# Patient Record
Sex: FEMALE | Race: WHITE | NOT HISPANIC OR LATINO
[De-identification: names, ages, dates, MRNs, and addresses within clinical notes are randomized per-mention and may not be internally consistent; named-entity substitution may affect disease eponyms.]

---

## 2019-09-24 PROBLEM — Z00.00 ENCOUNTER FOR PREVENTIVE HEALTH EXAMINATION: Status: ACTIVE | Noted: 2019-09-24

## 2019-09-25 ENCOUNTER — APPOINTMENT (OUTPATIENT)
Age: 74
End: 2019-09-25
Payer: MEDICARE

## 2019-09-25 VITALS — WEIGHT: 195 LBS | HEIGHT: 65 IN | BODY MASS INDEX: 32.49 KG/M2

## 2019-09-25 PROCEDURE — 99203 OFFICE O/P NEW LOW 30 MIN: CPT

## 2019-09-25 NOTE — PHYSICAL EXAM
[FreeTextEntry1] : Physical exam reveals a healthy-looking patient in no apparent distress patient appeared to be fairly alert and oriented examination of the right shoulder post reverse prosthesis demonstrated grade range of motion no pain no swelling examination of the left shoulder demonstrate and limited range of motion at the glenohumeral joint there is no palpable mass no gross neurovascular deficit. Review of the MRI scan of the left shoulder demonstrate a multifocal synovial membrane bursitis with a rice body and extensive was option of the proximal humerus with fluid content no malignant tumor could be identified. At this stage patient was recommended to be followed conservatively however the need for surgical decompression bone grafting of the left humerus was discussed with the patient

## 2019-09-25 NOTE — HISTORY OF PRESENT ILLNESS
[FreeTextEntry1] : Disease the first and 50th over 74 years old with a history of rheumatoid arthritis post bilateral total hip replacement right shoulder reversed bipolar prosthesis a multi-multiple other surgeries. At this stage and patient presented for evaluation of degenerative arthritic changes over the left shoulder are related to rheumatoid arthritis and at the same time and multiple soft tissue and synovial cystic lesions with rice bodies surrounding the left shoulder and multi-focal cyst formation involving the entire left proximal humerus all seems to be related to rheumatoid arthritis

## 2019-09-25 NOTE — REASON FOR VISIT
[FreeTextEntry1] : Presented for evaluation of marked focal soft tissue lesion and the cystic lesion left shoulder related to rheumatoid arthritis

## 2022-05-05 ENCOUNTER — NON-APPOINTMENT (OUTPATIENT)
Age: 77
End: 2022-05-05

## 2022-07-18 ENCOUNTER — APPOINTMENT (OUTPATIENT)
Dept: VASCULAR SURGERY | Facility: CLINIC | Age: 77
End: 2022-07-18

## 2022-07-18 VITALS — HEIGHT: 65 IN | WEIGHT: 180 LBS | BODY MASS INDEX: 29.99 KG/M2

## 2022-07-18 DIAGNOSIS — Z80.0 FAMILY HISTORY OF MALIGNANT NEOPLASM OF DIGESTIVE ORGANS: ICD-10-CM

## 2022-07-18 DIAGNOSIS — I10 ESSENTIAL (PRIMARY) HYPERTENSION: ICD-10-CM

## 2022-07-18 DIAGNOSIS — Z87.891 PERSONAL HISTORY OF NICOTINE DEPENDENCE: ICD-10-CM

## 2022-07-18 DIAGNOSIS — Z82.49 FAMILY HISTORY OF ISCHEMIC HEART DISEASE AND OTHER DISEASES OF THE CIRCULATORY SYSTEM: ICD-10-CM

## 2022-07-18 DIAGNOSIS — M06.9 RHEUMATOID ARTHRITIS, UNSPECIFIED: ICD-10-CM

## 2022-07-18 PROCEDURE — 99203 OFFICE O/P NEW LOW 30 MIN: CPT

## 2022-07-19 PROBLEM — I10 HYPERTENSION: Status: ACTIVE | Noted: 2022-07-18

## 2022-07-19 PROBLEM — Z80.0 FAMILY HISTORY OF MALIGNANT NEOPLASM OF STOMACH: Status: ACTIVE | Noted: 2022-07-18

## 2022-07-19 PROBLEM — M06.9 RHEUMATOID ARTHRITIS: Status: ACTIVE | Noted: 2022-07-18

## 2022-07-19 PROBLEM — Z87.891 FORMER SMOKER: Status: ACTIVE | Noted: 2022-07-18

## 2022-07-19 PROBLEM — Z82.49 FAMILY HISTORY OF PULMONARY EMBOLISM: Status: ACTIVE | Noted: 2022-07-18

## 2022-07-19 RX ORDER — PREDNISONE 5 MG/1
5 TABLET ORAL
Qty: 180 | Refills: 0 | Status: ACTIVE | COMMUNITY
Start: 2022-07-16

## 2022-07-19 RX ORDER — PANTOPRAZOLE 40 MG/1
40 TABLET, DELAYED RELEASE ORAL
Qty: 90 | Refills: 0 | Status: ACTIVE | COMMUNITY
Start: 2021-10-04

## 2022-07-19 RX ORDER — FOLIC ACID 1 MG/1
1 TABLET ORAL
Qty: 90 | Refills: 0 | Status: ACTIVE | COMMUNITY
Start: 2022-03-28

## 2022-07-19 RX ORDER — TRAZODONE HYDROCHLORIDE 50 MG/1
50 TABLET ORAL
Qty: 90 | Refills: 0 | Status: ACTIVE | COMMUNITY
Start: 2021-07-26 | End: 1900-01-01

## 2022-07-19 NOTE — ASSESSMENT
[FreeTextEntry1] : 76 yo female with severe rheumatoid arthritis. She is planning to undergo a right knee replacement. She has bilateral lower extremity edema and skin discoloration. I recommended that she undergo a venous duplex. She will follow up when the results are available.

## 2022-07-19 NOTE — PHYSICAL EXAM
[JVD] : no jugular venous distention  [Normal Breath Sounds] : Normal breath sounds [Normal Rate and Rhythm] : normal rate and rhythm [2+] : left 2+ [Ankle Swelling (On Exam)] : present [Ankle Swelling On The Right] : mild [] : bilaterally [Ankle Swelling Bilaterally] : severe [No Rash or Lesion] : No rash or lesion [Alert] : alert [Oriented to Person] : oriented to person [Oriented to Place] : oriented to place [Oriented to Time] : oriented to time [de-identified] : Awake and Alert [FreeTextEntry1] : no palpable pedal pulse right - triphasic PT signal [de-identified] : Appropriate

## 2022-07-19 NOTE — HISTORY OF PRESENT ILLNESS
[FreeTextEntry1] : 78 yo female presents to the office with a chief complaint of bilateral lower extremity edema and skin discoloration. The patient has severe rheumatoid arthritis and is scheduled to undergo a right knee replacement. She reports that the orthopedist is reluctant to proceed secondary to the edema and skin discoloration. She denies a history of DVT or SVT. She does not currently wear compression stockings.

## 2022-07-19 NOTE — REVIEW OF SYSTEMS
[Fever] : no fever [Chills] : no chills [Lower Ext Edema] : lower extremity edema [Limb Pain] : limb pain [Limb Swelling] : limb swelling [As Noted in HPI] : as noted in HPI [Limb Weakness] : limb weakness [Difficulty Walking] : difficulty walking

## 2022-07-19 NOTE — CONSULT LETTER
[Dear  ___] : Dear  [unfilled], [Consult Letter:] : I had the pleasure of evaluating your patient, [unfilled]. [Please see my note below.] : Please see my note below. [Consult Closing:] : Thank you very much for allowing me to participate in the care of this patient.  If you have any questions, please do not hesitate to contact me. [Sincerely,] : Sincerely, [FreeTextEntry2] : Shyanne Amor [FreeTextEntry3] : Andrea Meraz MD, RPVI\par Chief, Vascular Surgery\par

## 2022-07-19 NOTE — REASON FOR VISIT
[Initial Evaluation] : an initial evaluation [Family Member] : family member [FreeTextEntry1] : bilateral lower extremity skin discoloration and edema

## 2022-08-05 ENCOUNTER — APPOINTMENT (OUTPATIENT)
Dept: HEART AND VASCULAR | Facility: CLINIC | Age: 77
End: 2022-08-05

## 2022-08-05 ENCOUNTER — APPOINTMENT (OUTPATIENT)
Dept: VASCULAR SURGERY | Facility: CLINIC | Age: 77
End: 2022-08-05

## 2022-08-05 VITALS
BODY MASS INDEX: 30.56 KG/M2 | HEART RATE: 79 BPM | HEIGHT: 64 IN | SYSTOLIC BLOOD PRESSURE: 130 MMHG | OXYGEN SATURATION: 94 % | DIASTOLIC BLOOD PRESSURE: 78 MMHG | WEIGHT: 179 LBS

## 2022-08-05 DIAGNOSIS — R60.0 LOCALIZED EDEMA: ICD-10-CM

## 2022-08-05 DIAGNOSIS — I87.2 VENOUS INSUFFICIENCY (CHRONIC) (PERIPHERAL): ICD-10-CM

## 2022-08-05 PROCEDURE — 93970 EXTREMITY STUDY: CPT

## 2022-08-05 PROCEDURE — 99213 OFFICE O/P EST LOW 20 MIN: CPT

## 2022-08-05 NOTE — ASSESSMENT
[FreeTextEntry1] : 78 yo female with severe rheumatoid arthritis. She is planning to undergo a right knee replacement.  She has bilateral skin discoloration and mild edema. She underwent a venous duplex which was negative for DVT and insufficiency. I am uncertain of the etiology of her skin discoloration. Her mild edema is likely dependant edema. I do no think there is a vascular contraindication to proceeding with knee surgery. Standard post procedure DVT prophylaxis should be adequate.\par \par 22 miin

## 2022-08-05 NOTE — PHYSICAL EXAM
[JVD] : no jugular venous distention  [Normal Breath Sounds] : Normal breath sounds [Normal Rate and Rhythm] : normal rate and rhythm [1+] : right 1+ [2+] : left 2+ [Ankle Swelling (On Exam)] : present [Ankle Swelling On The Right] : mild [] : bilaterally [Ankle Swelling Bilaterally] : severe [No Rash or Lesion] : No rash or lesion [Alert] : alert [Oriented to Person] : oriented to person [Oriented to Place] : oriented to place [Oriented to Time] : oriented to time [de-identified] : Awake and Alert [de-identified] : skin discoloration bl [de-identified] : G [de-identified] : Appropriate

## 2022-08-05 NOTE — HISTORY OF PRESENT ILLNESS
[FreeTextEntry1] : 76 yo female presents to the office with a chief complaint of bilateral lower extremity edema and skin discoloration. The patient has severe rheumatoid arthritis and is scheduled to undergo a right knee replacement. She reports that the orthopedist is reluctant to proceed secondary to the edema and skin discoloration. She denies a history of DVT or SVT. She does not currently wear compression stockings.  [de-identified] : 78 yo female scheduled to undergo a right knee replacement for severe arthritis. She has bilateral lower extremity edema and skin discoloration. She underwent a venous dupelx and is here to discuss the results.

## 2023-02-08 ENCOUNTER — TRANSCRIPTION ENCOUNTER (OUTPATIENT)
Age: 78
End: 2023-02-08

## 2023-02-13 ENCOUNTER — APPOINTMENT (OUTPATIENT)
Dept: PULMONOLOGY | Facility: CLINIC | Age: 78
End: 2023-02-13

## 2023-02-13 ENCOUNTER — APPOINTMENT (OUTPATIENT)
Dept: NEUROLOGY | Facility: CLINIC | Age: 78
End: 2023-02-13

## 2023-02-24 ENCOUNTER — APPOINTMENT (OUTPATIENT)
Dept: NEUROLOGY | Facility: CLINIC | Age: 78
End: 2023-02-24
Payer: MEDICARE

## 2023-02-24 ENCOUNTER — NON-APPOINTMENT (OUTPATIENT)
Age: 78
End: 2023-02-24

## 2023-02-24 VITALS
OXYGEN SATURATION: 96 % | SYSTOLIC BLOOD PRESSURE: 165 MMHG | HEART RATE: 76 BPM | HEIGHT: 64 IN | DIASTOLIC BLOOD PRESSURE: 99 MMHG | BODY MASS INDEX: 29.02 KG/M2 | WEIGHT: 170 LBS

## 2023-02-24 DIAGNOSIS — R29.90 UNSPECIFIED SYMPTOMS AND SIGNS INVOLVING THE NERVOUS SYSTEM: ICD-10-CM

## 2023-02-24 PROCEDURE — 99214 OFFICE O/P EST MOD 30 MIN: CPT

## 2023-02-24 RX ORDER — METHOTREXATE 2.5 MG/1
2.5 TABLET ORAL
Qty: 84 | Refills: 0 | Status: DISCONTINUED | COMMUNITY
Start: 2022-02-02 | End: 2023-02-24

## 2023-02-24 RX ORDER — DILTIAZEM HYDROCHLORIDE 120 MG/1
120 CAPSULE, EXTENDED RELEASE ORAL
Qty: 90 | Refills: 0 | Status: DISCONTINUED | COMMUNITY
Start: 2021-10-29 | End: 2023-02-24

## 2023-02-24 RX ORDER — METHADONE HYDROCHLORIDE 10 MG/1
10 TABLET ORAL
Qty: 60 | Refills: 0 | Status: DISCONTINUED | COMMUNITY
Start: 2022-07-01 | End: 2023-02-24

## 2023-02-27 PROBLEM — R29.90 EPISODE OF TRANSIENT NEUROLOGIC SYMPTOMS: Status: ACTIVE | Noted: 2023-02-10

## 2023-02-27 NOTE — PHYSICAL EXAM
[FreeTextEntry1] : Mental Status: alert to month, does not know the day, knows the year. Knows name of hospital. Able to calculate number of quarters in $1.50. Able to do serial sevens. 3/3 registration of three items, 1/3 recall at three minutes, 2/3 with cues. \par CN: visual acuity, VFF, blink to confrontation \par III, IV, VI, PERRL, EOMI \par V sensation normal to light touch, pinprick\par VII normal squint vs resistance, normal smile, face symmetric \par VIII: normal hearing \par IX, X normal gag, symmetric palate, uvula raises midline \par XI normal shrug versus resistance and lateralization of head versus resistance \par XII tongue symmetric, normal strength, no tremor or fasciculation \par Motor: full strength throughout apart form pain limited weakness in left shoulder and in right hip 4-/5\par Sensory: normal to LT \par Reflexes \par Brachioradialis, biceps, triceps  1+ patella unable to elicit and ankles 1+ \par Plantar flexor response bilaterally \par Coordination: no dysmetria on FNF \par Gait : ambulates with a walker, antalgic gait, right hip juts out when ambulating (legs are unequal in length)\par

## 2023-02-27 NOTE — CONSULT LETTER
[Dear  ___] : Dear  [unfilled], [Courtesy Letter:] : I had the pleasure of seeing your patient, [unfilled], in my office today. [Please see my note below.] : Please see my note below. [Sincerely,] : Sincerely, [FreeTextEntry3] : Kang Cruz MD\par [facility] \par

## 2023-02-27 NOTE — ASSESSMENT
[FreeTextEntry1] : Please get a 24-hour EEG\par The day before the EEG and the day of the EEG , please stop levetiracetam\par After the EEG is off, you can restart it (750 mg twice a day)\par Call me in one week to go over the results of the EEG \par If it looks okay, we can decrease dose of levetiracetam to 500 mg twice a day \par Physical therapy \par Goal blood pressure < 130/80\par Continue eliquis \par Return to clinic in four months to see Dr. Kang Cruz \par \par Diagnosis: hypertensive emergency, provoked seizure in the setting of hypertensive emergency, rule out focal impaired aware seizure, less likely TIA \par \par Daughter: Clarisa [phone] \par

## 2023-02-27 NOTE — DATA REVIEWED
[de-identified] : 2/5/23\par \par FINDINGS:\par There is mild diffuse parenchymal volume loss. There are T2 prolongation\par signal abnormalities in the periventricular subcortical white matter likely\par related to mild chronic microvascular ischemic changes.\par \par There is no acute parenchymal hemorrhage, parenchymal mass, mass effect or\par midline shift. There is no extra-axial fluid collection.  There is no\par hydrocephalus.  There is no acute infarct.\par \par The visualized paranasal sinuses are well aerated. Partial opacification\par mastoid air cells.\par \par IMPRESSION:\par Limited exam due to motion\par \par \par 2/4/23 \par CT head without contrast\par \par INDICATION: Possible stroke\par \par TECHNIQUE: CT examination of the head performed without contrast. Multiple\par axial images obtained from skull base to vertex. Sagittal/coronal reformatted\par images obtained from axial data set. Images reviewed in soft tissue and bone\par windows.\par \par \par COMPARISON: None available. \par \par FINDINGS:\par \par Ventricles and sulci are mildly proportionately prominent likely related to\par mild parenchymal volume loss. No hydrocephalus. No extra-axial fluid\par collection identified.\par \par No acute intracranial hemorrhage identified. There is mild subcortical and\par periventricular white matter attenuation nonspecific but favored to reflect\par sequela of mild chronic microvascular ischemia. Gray-white differentiation is\par preserved without CT evidence for acute transcortical infarction. There is no\par significant midline shift, mass effect or herniation.\par \par Sellar and suprasellar region are unremarkable in appearance.\par \par Visualized paranasal sinuses and mastoid air cells are well aerated. No\par significant cerebellar tonsillar herniation. \par \par No displaced calvarial fractures are identified. No suspicious expansile or\par destructive calvarial lesion identified. No significant scalp soft tissue\par swelling identified. \par \par \par \par \par IMPRESSION:\par \par No acute intracranial hemorrhage, hydrocephalus or extra-axial fluid\par collection.\par Mild parenchymal volume loss and mild chronic microvascular ischemic changes.\par No CT evidence for acute transcortical infarction. Please note that MR imaging\par is a more sensitive imaging modality for detection of acute infarction and may\par be obtained as clinically warranted.\par \par If clinicians have any questions/need for clarification regarding this report,\par Dr. Kingsley Rincon can be best reached via the patient secure hospital email\par system\par \par CTA (CT angiogram) of the HEAD and NECK dated 2/4/2023 6:07 PM \par \par CLINICAL STATEMENT: Neuro deficit, acute, stroke suspected.\par \par TECHNIQUE: Following the administration of 60 cc Omnipaque 350 intravenous\par contrast, CT angiograms of the head and neck were obtained. 3D image\par postprocessing was performed with vascular and multi-planar reformats for the\par evaluation of the arteries. All qualitative and quantitative assessments of\par carotid bifurcation and proximal internal carotid artery stenoses are made\par referencing the distal internal carotid artery. \par \par COMPARISON: None.\par \par FINDINGS:\par \par CTA of the head: \par The proximal aspects of anterior, middle, and posterior cerebral arteries are\par patent. Fetal origin of the left posterior cerebral artery is incidentally\par noted. The intracranial vertebral and basilar arteries are patent. No\par hemodynamically significant stenosis or aneurysm is identified. Mild\par calcification of the bilateral distal internal carotid arteries is seen.\par \par CTA of the neck: \par The vertebral arteries, common carotid arteries, and internal carotid arteries\par are normal in course and caliber. There is no hemodynamically significant\par stenosis, arterial dissection, or aneurysm. Mild calcification of the\par bilateral carotid bifurcations is noted.\par \par Other: \par The visualized upper lungs demonstrate centrilobular emphysema. Degenerative\par changes of the spine and bilateral temporomandibular joints are present.\par Posterior fusion hardware within thoracic spine is partially imaged.\par \par \par IMPRESSION:\par \par No hemodynamically significant large vessel stenosis or occlusion in the head\par and neck.\par \par \par CTP: Patient motion is present which limits evaluation. There is an area\par of apparent increased Tmax greater than 6 seconds within the brainstem/right\par cerebellar peduncle region without associated CBF abnormality.\par \par CBF less than 30% volume: 0 mL\par Tmax greater than 6 seconds volume: 10 mL\par Mismatch volume: 10 mL\par Mismatch ratio: Infinite\par  [de-identified] : [facility]: 2/2023 Report pending  -2 hour: left rhythmic delta activity - suggestive of cortical hyperexcitability

## 2023-02-27 NOTE — HISTORY OF PRESENT ILLNESS
[FreeTextEntry1] : 77-year-old  RH woman with a history of MTHFR gene on aspirin, hypothyroidism, rheumatoid arthritis, SVT, hypertension and new onset atrial fibrillation, back surgery. She presented to ED with an episode of not speaking well. Daughter thought speech may have been slurred and she was not making sense. She knew what she wanted to say but couldn't get the words out. She may have been confused as well and was lethargic.  She didn't remember the passcode on her phone. \par Her blood pressure was as high as 222/130. No TNK was given as last known well was out of window. \par On exam by neurology she had intermittent expressive aphasia. NIHSS was 2 (1 for orientation and 1 for mild loss of fluency).  \par She was found to have new onset atrial fibrillation with RVR, given eliquis. \par MRI brain was suboptimal with no acute intracranial hemorrhage or acute infarct. \par She was given diazepam for imaging ? and she was noted to be back to baseline. \par Given concern for seizure she was started on  mg bid. \par She had a 2 hour EEG which showed left rhythmic delta activity suggestive of possible cortical irritability. \par \par Prior to her admission she went to [facility].  Her sodium was low, thought related to a diuretic and her antihypertensive medications were readjusted. She may have been taking an incorrect dose and this is why her BP was so high. No further episodes of word-finding difficulty. Tolerating levetiracetam. No further episodes. \par \par Seizure risk factors\par No further episodes of word-finding difficulty\par No history of episodes of loss of consciousness\par No history of convulsion\par No family history of epilepsy \par No history of meningitis or encephalitis\par No history of major head injury \par No difficulty with birth\par Met developmental milestones\par Did well in school \par \par Medications:\par amlodipine 5 mg qd \par metoprolol 50 mg bid \par levetiracetam 750 mg bid\par eliquis 5 mg bid \par lipitor 40 mg qhs\par lasix 20 mg qd\par leflunomide 20 mg qd \par trazodone 50 mg qhs \par pantoprazole \par folic acid \par vitamin d\par albuterol\par fluticasone \par trelegy \par ondansetron\par \par PMH\par COPD, prior smoker\par atrial fibrillation\par focal seizures \par hypertension \par \par Social History\par Lives with daughter \par Not driving \par ambulates with a walker \par Smoker 2003, 1.5 ppd \par no alcohol, no drugs \par 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 Shyanne Cuevas PMD \par  \par \par \par \par \par \par

## 2023-02-27 NOTE — DISCUSSION/SUMMARY
[FreeTextEntry1] : Felicia is a pleasant 77-year-old RH woman with pmh of smoking, COPD, rheumatoid arthritis, hypertension, MTHFR mutation, new onset atrial fibrillation on eliquis, who presented to [facility] with transient aphasia and confusion in the setting of blood pressure in the 200s systolic. MRI brain without acute stroke. Her symptoms resolved when she was given valium for an imaging study. 2 hour EEG with left LRDA? suggestive of cortical hyper-excitability, no seizure. \par \par On  mg bid. Tolerating well. In physical therapy for unsteady gait, no falls. Lives with supportive daughter. \par \par Discussed differential including hypertensive emergency, provoked focal seizure or unprovoked seizure. Given her advanced age, high fall risk and the fact that she is on systemic AC, would continue an AED for now. Can get an EEG off levetiracetam. If EEG is normal, can consider lowering dose of LEV to 500 mg bid. \par

## 2023-03-01 ENCOUNTER — FORM ENCOUNTER (OUTPATIENT)
Age: 78
End: 2023-03-01

## 2023-03-02 ENCOUNTER — APPOINTMENT (OUTPATIENT)
Dept: NEUROLOGY | Facility: CLINIC | Age: 78
End: 2023-03-02
Payer: MEDICARE

## 2023-03-02 PROCEDURE — 95816 EEG AWAKE AND DROWSY: CPT

## 2023-03-03 ENCOUNTER — APPOINTMENT (OUTPATIENT)
Dept: NEUROLOGY | Facility: CLINIC | Age: 78
End: 2023-03-03

## 2023-03-03 PROCEDURE — 95719 EEG PHYS/QHP EA INCR W/O VID: CPT

## 2023-03-03 PROCEDURE — 95708 EEG WO VID EA 12-26HR UNMNTR: CPT

## 2023-03-03 PROCEDURE — 95700 EEG CONT REC W/VID EEG TECH: CPT

## 2023-03-15 ENCOUNTER — RX RENEWAL (OUTPATIENT)
Age: 78
End: 2023-03-15

## 2023-03-22 DIAGNOSIS — I48.0 PAROXYSMAL ATRIAL FIBRILLATION: ICD-10-CM

## 2023-03-23 ENCOUNTER — APPOINTMENT (OUTPATIENT)
Dept: PULMONOLOGY | Facility: CLINIC | Age: 78
End: 2023-03-23
Payer: MEDICARE

## 2023-03-23 VITALS
OXYGEN SATURATION: 98 % | HEIGHT: 64 IN | BODY MASS INDEX: 29.02 KG/M2 | WEIGHT: 170 LBS | SYSTOLIC BLOOD PRESSURE: 170 MMHG | DIASTOLIC BLOOD PRESSURE: 80 MMHG | HEART RATE: 83 BPM

## 2023-03-23 DIAGNOSIS — J44.9 CHRONIC OBSTRUCTIVE PULMONARY DISEASE, UNSPECIFIED: ICD-10-CM

## 2023-03-23 PROCEDURE — 99213 OFFICE O/P EST LOW 20 MIN: CPT

## 2023-03-23 RX ORDER — LISINOPRIL AND HYDROCHLOROTHIAZIDE TABLETS 20; 12.5 MG/1; MG/1
20-12.5 TABLET ORAL
Qty: 90 | Refills: 0 | Status: DISCONTINUED | COMMUNITY
Start: 2021-08-26 | End: 2023-03-23

## 2023-03-23 NOTE — PHYSICAL EXAM
[No Acute Distress] : no acute distress [Normal Appearance] : normal appearance [No Neck Mass] : no neck mass [Normal Rate/Rhythm] : normal rate/rhythm [Normal S1, S2] : normal s1, s2 [No Murmurs] : no murmurs [No Resp Distress] : no resp distress [Clear to Auscultation Bilaterally] : clear to auscultation bilaterally [No Clubbing] : no clubbing [No Edema] : no edema [Oriented x3] : oriented x3 [Normal Affect] : normal affect [TextBox_99] : Joing deformities in hands c/w RA; kyphosis of chest [TextBox_125] : bruising throughout

## 2023-03-23 NOTE — DISCUSSION/SUMMARY
[FreeTextEntry1] : 77F with COPD here for routine follow-up\par \par #COPD\par - Well controlled since last visit, no exacerbations. She is on chronic low-dose prednisone for RA\par - Her regimen is controlling her symptoms well but she cannot afford Trelegy so has been receiving samples\par - She will run out soon, so will likely need to send Rx for alternative inhaler regimen\par \par RTC in 3-4 months or sooner

## 2023-03-23 NOTE — HISTORY OF PRESENT ILLNESS
[Former] : former [TextBox_4] : Ms. Pastor is a 77F here for follow-up of COPD. She was previously following with Dr. March (last visit 1/2023).\par \par Current regimen: Trelegy\par Exacerbations: rare\par \par PFTs 5/2021\par Moderate obstruction, no BD response. No hyperinflation/airtrapping, DLCO 48%\par \par Pt feels well today and without significant respiratory complaints. She has not had any exacerbations but knows her seasonal allergies will start soon so will resume flonase. She has been compliant with Trelegy but only taking samples. She has not tried other inhalers before so unsure what else has worked for her. Denies cough, shortness of breath, wheezing, chest pressure. Currently she is dealing with more L shoulder pain and as a result her BP is running higher. She has follow-up with her Ortho to address non-invasive treatments for her shoulder and pain control.

## 2023-04-10 ENCOUNTER — NON-APPOINTMENT (OUTPATIENT)
Age: 78
End: 2023-04-10

## 2023-06-07 ENCOUNTER — APPOINTMENT (OUTPATIENT)
Dept: PULMONOLOGY | Facility: CLINIC | Age: 78
End: 2023-06-07
Payer: MEDICARE

## 2023-06-07 VITALS
SYSTOLIC BLOOD PRESSURE: 156 MMHG | HEART RATE: 82 BPM | BODY MASS INDEX: 30.9 KG/M2 | DIASTOLIC BLOOD PRESSURE: 85 MMHG | WEIGHT: 181 LBS | HEIGHT: 64 IN | OXYGEN SATURATION: 96 %

## 2023-06-07 PROCEDURE — 99213 OFFICE O/P EST LOW 20 MIN: CPT

## 2023-06-07 RX ORDER — BUDESONIDE, GLYCOPYRROLATE, AND FORMOTEROL FUMARATE 160; 9; 4.8 UG/1; UG/1; UG/1
160-9-4.8 AEROSOL, METERED RESPIRATORY (INHALATION) TWICE DAILY
Qty: 1 | Refills: 5 | Status: DISCONTINUED | COMMUNITY
Start: 2023-03-23 | End: 2023-06-07

## 2023-06-07 RX ORDER — LEFLUNOMIDE 20 MG/1
20 TABLET, FILM COATED ORAL
Qty: 90 | Refills: 0 | Status: DISCONTINUED | COMMUNITY
Start: 2022-02-02 | End: 2023-06-07

## 2023-06-07 NOTE — HISTORY OF PRESENT ILLNESS
[Former] : former [TextBox_4] : Ms. Pastor is a 77F here for follow-up of COPD. She was previously following with Dr. March (last visit 1/2023).\par \par Current regimen: Trelegy\par Exacerbations: rare\par \par PFTs 5/2021\par Moderate obstruction, no BD response. No hyperinflation/airtrapping, DLCO 48%\par \par Pt feels well today and without significant respiratory complaints. She has not had any exacerbations but knows her seasonal allergies will start soon so will resume flonase. She has been compliant with Trelegy but only taking samples. She has not tried other inhalers before so unsure what else has worked for her. Denies cough, shortness of breath, wheezing, chest pressure. Currently she is dealing with more L shoulder pain and as a result her BP is running higher. She has follow-up with her Ortho to address non-invasive treatments for her shoulder and pain control.\par \par 6/2023\par Ms. Pastor returns today for follow-up. She is again accompanied by her friend. Still dealing with L shoulder pain. She has yet to follow-up with Ortho re: treatment options. Her breathing is improving now that she was able to get the Trelegy through her insurance. She needs Rx to fill one month at a time instead of 3 months. No recent AECOPDs.

## 2023-06-07 NOTE — REVIEW OF SYSTEMS
[Cough] : cough [Sputum] : no sputum [SOB on Exertion] : sob on exertion [Arthralgias] : arthralgias [Chronic Pain] : chronic pain [Negative] : Endocrine

## 2023-06-07 NOTE — PHYSICAL EXAM
[No Acute Distress] : no acute distress [Normal Appearance] : normal appearance [No Neck Mass] : no neck mass [Normal Rate/Rhythm] : normal rate/rhythm [Normal S1, S2] : normal s1, s2 [No Murmurs] : no murmurs [No Resp Distress] : no resp distress [Clear to Auscultation Bilaterally] : clear to auscultation bilaterally [No Clubbing] : no clubbing [No Edema] : no edema [Oriented x3] : oriented x3 [Normal Affect] : normal affect [TextBox_99] : Joint deformities in hands c/w RA; kyphosis of chest

## 2023-06-07 NOTE — DISCUSSION/SUMMARY
[FreeTextEntry1] : 77F with COPD here for routine follow-up\par \par #COPD\par - Well controlled since last visit, no exacerbations but had worsening of symptoms due to interruption in her inhaler regimen from insurance issues, now back on Trelegy and improving. She is on chronic low-dose prednisone for RA\par \par RTC in 3-4 months or sooner

## 2023-06-26 ENCOUNTER — APPOINTMENT (OUTPATIENT)
Dept: NEUROLOGY | Facility: CLINIC | Age: 78
End: 2023-06-26
Payer: MEDICARE

## 2023-06-26 DIAGNOSIS — Z87.898 PERSONAL HISTORY OF OTHER SPECIFIED CONDITIONS: ICD-10-CM

## 2023-06-26 PROCEDURE — 99214 OFFICE O/P EST MOD 30 MIN: CPT | Mod: 95

## 2023-06-26 RX ORDER — LEVETIRACETAM 750 MG/1
750 TABLET, FILM COATED ORAL
Qty: 180 | Refills: 0 | Status: DISCONTINUED | COMMUNITY
Start: 2023-03-10 | End: 2023-06-26

## 2023-06-26 RX ORDER — APIXABAN 5 MG/1
TABLET, FILM COATED ORAL
Refills: 0 | Status: ACTIVE | COMMUNITY

## 2023-06-26 NOTE — PHYSICAL EXAM
[FreeTextEntry1] : [facility] video chat\par knows exact month, day and year. Speech fluent. Can name objects. Can follow commands \par EOMI, face activates symmetrically \par \par

## 2023-06-26 NOTE — HISTORY OF PRESENT ILLNESS
[Home] : at home, [unfilled] , at the time of the visit. [Medical Office: ([facility])___] : at the medical office located in  [Verbal consent obtained from patient] : the patient, [unfilled] [FreeTextEntry1] : Last seen in clinic on 2/24/23. Could not come to clinic today because of a COVID exposure. Doing well. Watching blood pressure, around 140s. Lives with daughter. Taking levetiracetam 750 mg bid. No major side effects. No falls. Ambulates with a walker and a cane. No falls. Doing well.  No clinical events concerning for seizure. \par \par Medications\par levetiracetam 750 mg twice a day \par eliquis 5 mg twice a day \par ondansetron\par folic acid \par prednisone \par trelegy \par trazodone 50 mg at night \par _______________________\par 2/24/23 \par 77-year-old  RH woman with a history of MTHFR gene on aspirin, hypothyroidism, rheumatoid arthritis, SVT, hypertension and new onset atrial fibrillation, back surgery. She presented to ED with an episode of not speaking well. Daughter thought speech may have been slurred and she was not making sense. She knew what she wanted to say but couldn't get the words out. She may have been confused as well and was lethargic.  She didn't remember the passcode on her phone. \par Her blood pressure was as high as 222/130. No TNK was given as last known well was out of window. \par On exam by neurology she had intermittent expressive aphasia. NIHSS was 2 (1 for orientation and 1 for mild loss of fluency).  \par She was found to have new onset atrial fibrillation with RVR, given eliquis. \par MRI brain was suboptimal with no acute intracranial hemorrhage or acute infarct. \par She was given diazepam for imaging ? and she was noted to be back to baseline. \par Given concern for seizure she was started on  mg bid. \par She had a 2 hour EEG which showed left rhythmic delta activity suggestive of possible cortical irritability. \par \par Prior to her admission she went to [facility].  Her sodium was low, thought related to a diuretic and her antihypertensive medications were readjusted. She may have been taking an incorrect dose and this is why her BP was so high. No further episodes of word-finding difficulty. Tolerating levetiracetam. No further episodes. \par \par Seizure risk factors\par No further episodes of word-finding difficulty\par No history of episodes of loss of consciousness\par No history of convulsion\par No family history of epilepsy \par No history of meningitis or encephalitis\par No history of major head injury \par No difficulty with birth\par Met developmental milestones\par Did well in school \par \par Medications:\par amlodipine 5 mg qd \par metoprolol 50 mg bid \par levetiracetam 750 mg bid\par eliquis 5 mg bid \par lipitor 40 mg qhs\par lasix 20 mg qd\par leflunomide 20 mg qd \par trazodone 50 mg qhs \par pantoprazole \par folic acid \par vitamin d\par albuterol\par fluticasone \par trelegy \par ondansetron\par \par PMH\par COPD, prior smoker\par atrial fibrillation\par focal seizures \par hypertension \par \par Social History\par Lives with daughter \par Not driving \par ambulates with a walker \par Smoker 2003, 1.5 ppd \par no alcohol, no drugs \par 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 Shyanne Cuevas PMD \par  \par \par \par \par \par \par

## 2023-06-26 NOTE — DATA REVIEWED
[de-identified] : 2/5/23\par \par FINDINGS:\par There is mild diffuse parenchymal volume loss. There are T2 prolongation\par signal abnormalities in the periventricular subcortical white matter likely\par related to mild chronic microvascular ischemic changes.\par \par There is no acute parenchymal hemorrhage, parenchymal mass, mass effect or\par midline shift. There is no extra-axial fluid collection.  There is no\par hydrocephalus.  There is no acute infarct.\par \par The visualized paranasal sinuses are well aerated. Partial opacification\par mastoid air cells.\par \par IMPRESSION:\par Limited exam due to motion\par \par \par 2/4/23 \par CT head without contrast\par \par INDICATION: Possible stroke\par \par TECHNIQUE: CT examination of the head performed without contrast. Multiple\par axial images obtained from skull base to vertex. Sagittal/coronal reformatted\par images obtained from axial data set. Images reviewed in soft tissue and bone\par windows.\par \par \par COMPARISON: None available. \par \par FINDINGS:\par \par Ventricles and sulci are mildly proportionately prominent likely related to\par mild parenchymal volume loss. No hydrocephalus. No extra-axial fluid\par collection identified.\par \par No acute intracranial hemorrhage identified. There is mild subcortical and\par periventricular white matter attenuation nonspecific but favored to reflect\par sequela of mild chronic microvascular ischemia. Gray-white differentiation is\par preserved without CT evidence for acute transcortical infarction. There is no\par significant midline shift, mass effect or herniation.\par \par Sellar and suprasellar region are unremarkable in appearance.\par \par Visualized paranasal sinuses and mastoid air cells are well aerated. No\par significant cerebellar tonsillar herniation. \par \par No displaced calvarial fractures are identified. No suspicious expansile or\par destructive calvarial lesion identified. No significant scalp soft tissue\par swelling identified. \par \par \par \par \par IMPRESSION:\par \par No acute intracranial hemorrhage, hydrocephalus or extra-axial fluid\par collection.\par Mild parenchymal volume loss and mild chronic microvascular ischemic changes.\par No CT evidence for acute transcortical infarction. Please note that MR imaging\par is a more sensitive imaging modality for detection of acute infarction and may\par be obtained as clinically warranted.\par \par If clinicians have any questions/need for clarification regarding this report,\par Dr. Kingsley Rincon can be best reached via the patient secure hospital email\par system\par \par CTA (CT angiogram) of the HEAD and NECK dated 2/4/2023 6:07 PM \par \par CLINICAL STATEMENT: Neuro deficit, acute, stroke suspected.\par \par TECHNIQUE: Following the administration of 60 cc Omnipaque 350 intravenous\par contrast, CT angiograms of the head and neck were obtained. 3D image\par postprocessing was performed with vascular and multi-planar reformats for the\par evaluation of the arteries. All qualitative and quantitative assessments of\par carotid bifurcation and proximal internal carotid artery stenoses are made\par referencing the distal internal carotid artery. \par \par COMPARISON: None.\par \par FINDINGS:\par \par CTA of the head: \par The proximal aspects of anterior, middle, and posterior cerebral arteries are\par patent. Fetal origin of the left posterior cerebral artery is incidentally\par noted. The intracranial vertebral and basilar arteries are patent. No\par hemodynamically significant stenosis or aneurysm is identified. Mild\par calcification of the bilateral distal internal carotid arteries is seen.\par \par CTA of the neck: \par The vertebral arteries, common carotid arteries, and internal carotid arteries\par are normal in course and caliber. There is no hemodynamically significant\par stenosis, arterial dissection, or aneurysm. Mild calcification of the\par bilateral carotid bifurcations is noted.\par \par Other: \par The visualized upper lungs demonstrate centrilobular emphysema. Degenerative\par changes of the spine and bilateral temporomandibular joints are present.\par Posterior fusion hardware within thoracic spine is partially imaged.\par \par \par IMPRESSION:\par \par No hemodynamically significant large vessel stenosis or occlusion in the head\par and neck.\par \par \par CTP: Patient motion is present which limits evaluation. There is an area\par of apparent increased Tmax greater than 6 seconds within the brainstem/right\par cerebellar peduncle region without associated CBF abnormality.\par \par CBF less than 30% volume: 0 mL\par Tmax greater than 6 seconds volume: 10 mL\par Mismatch volume: 10 mL\par Mismatch ratio: Infinite\par  [de-identified] : [id number]/97392 - Ambulatory EEG: Rare bifrontal sharp waves suggestive of generalized cortical hyper-excitability, rare left anterior temporal LRDA, Rare GRDA\par cEEG_[facility]: 2/2023 Report pending  -2 hour: left rhythmic delta activity - suggestive of cortical hyperexcitability

## 2023-06-26 NOTE — ASSESSMENT
[FreeTextEntry1] : Continue levetiracetam 500 mg bid (EEG with bifrontal sharp waves and left anterior temporal LRDA)\par \par Call if seizure\par Return to clinic in four months

## 2023-09-13 ENCOUNTER — APPOINTMENT (OUTPATIENT)
Dept: PULMONOLOGY | Facility: CLINIC | Age: 78
End: 2023-09-13
Payer: MEDICARE

## 2023-09-13 VITALS
SYSTOLIC BLOOD PRESSURE: 140 MMHG | HEIGHT: 64 IN | HEART RATE: 81 BPM | BODY MASS INDEX: 30.73 KG/M2 | OXYGEN SATURATION: 98 % | WEIGHT: 180 LBS | DIASTOLIC BLOOD PRESSURE: 82 MMHG

## 2023-09-13 PROCEDURE — 99212 OFFICE O/P EST SF 10 MIN: CPT

## 2023-09-13 RX ORDER — ONDANSETRON 4 MG/1
4 TABLET, ORALLY DISINTEGRATING ORAL EVERY 6 HOURS
Qty: 30 | Refills: 3 | Status: DISCONTINUED | COMMUNITY
Start: 2023-06-05 | End: 2023-09-13

## 2023-12-29 RX ORDER — FLUTICASONE FUROATE, UMECLIDINIUM BROMIDE AND VILANTEROL TRIFENATATE 200; 62.5; 25 UG/1; UG/1; UG/1
200-62.5-25 POWDER RESPIRATORY (INHALATION) DAILY
Qty: 1 | Refills: 5 | Status: ACTIVE | COMMUNITY
Start: 2023-05-15 | End: 1900-01-01

## 2024-01-17 ENCOUNTER — APPOINTMENT (OUTPATIENT)
Dept: PULMONOLOGY | Facility: CLINIC | Age: 79
End: 2024-01-17
Payer: MEDICARE

## 2024-01-17 VITALS
HEART RATE: 93 BPM | HEIGHT: 64 IN | WEIGHT: 180 LBS | DIASTOLIC BLOOD PRESSURE: 80 MMHG | TEMPERATURE: 97.9 F | BODY MASS INDEX: 30.73 KG/M2 | OXYGEN SATURATION: 96 % | SYSTOLIC BLOOD PRESSURE: 130 MMHG

## 2024-01-17 DIAGNOSIS — J01.10 ACUTE FRONTAL SINUSITIS, UNSPECIFIED: ICD-10-CM

## 2024-01-17 PROCEDURE — 99213 OFFICE O/P EST LOW 20 MIN: CPT

## 2024-01-17 RX ORDER — ALBUTEROL SULFATE 90 UG/1
108 (90 BASE) INHALANT RESPIRATORY (INHALATION)
Qty: 1 | Refills: 5 | Status: ACTIVE | COMMUNITY
Start: 2022-04-14 | End: 1900-01-01

## 2024-01-17 NOTE — REVIEW OF SYSTEMS
[SOB on Exertion] : sob on exertion [Arthralgias] : arthralgias [Chronic Pain] : chronic pain [Negative] : Endocrine [Fever] : no fever [Sore Throat] : sore throat [Nasal Congestion] : nasal congestion [Postnasal Drip] : postnasal drip [Sinus Problems] : sinus problems [Cough] : cough [Hemoptysis] : no hemoptysis [Chest Tightness] : no chest tightness [Sputum] : sputum [Dyspnea] : no dyspnea [Pleuritic Pain] : no pleuritic pain [Wheezing] : no wheezing [Headache] : headache [Dizziness] : dizziness

## 2024-01-17 NOTE — PHYSICAL EXAM
[No Acute Distress] : no acute distress [Normal Rate/Rhythm] : normal rate/rhythm [Normal S1, S2] : normal s1, s2 [No Murmurs] : no murmurs [No Resp Distress] : no resp distress [Clear to Auscultation Bilaterally] : clear to auscultation bilaterally [No Clubbing] : no clubbing [No Edema] : no edema [Oriented x3] : oriented x3 [Normal Affect] : normal affect [No Acc Muscle Use] : no acc muscle use [TextBox_11] : Could not visualize R TM, L TM without fullness or exudate [TextBox_99] : Joint deformities in hands c/w RA; kyphosis of chest

## 2024-01-17 NOTE — HISTORY OF PRESENT ILLNESS
[TextBox_4] : Ms. Pozo is a 77F here for follow-up of COPD. She was previously following with Dr. March (last visit 1/2023).  Current regimen: Trelegy Exacerbations: rare  PFTs 5/2021 Moderate obstruction, no BD response. No hyperinflation/airtrapping, DLCO 48%  Pt feels well today and without significant respiratory complaints. She has not had any exacerbations but knows her seasonal allergies will start soon so will resume flonase. She has been compliant with Trelegy but only taking samples. She has not tried other inhalers before so unsure what else has worked for her. Denies cough, shortness of breath, wheezing, chest pressure. Currently she is dealing with more L shoulder pain and as a result her BP is running higher. She has follow-up with her Ortho to address non-invasive treatments for her shoulder and pain control.  6/2023 Ms. Pozo returns today for follow-up. She is again accompanied by her friend. Still dealing with L shoulder pain. She has yet to follow-up with Ortho re: treatment options. Her breathing is improving now that she was able to get the Trelegy through her insurance. She needs Rx to fill one month at a time instead of 3 months. No recent AECOPDs.    9/2023 Ms. Pozo returns today for follow-up. Doing well since last visit. No recent exacerbations. Denies cough, sputum production, wheezing. She remains compliant with Trelegy. She is requesting refill of zofran for occasional nausea that is food-related.  1/2024 Ms. POZO returns today for follow-up. She is again accompanied by her family member. She notes that about a week ago she has had sinus fullness with subsequent HA and new cough. She's had episodes of dizziness due to head fullness. She also notes low grade fever last week. She now has a more productive cough and runny nose. No ear pain, eye pain, shortness of breath, wheezing, hemoptysis.

## 2024-01-19 DIAGNOSIS — B37.31 ACUTE CANDIDIASIS OF VULVA AND VAGINA: ICD-10-CM

## 2024-01-19 RX ORDER — FLUCONAZOLE 150 MG/1
150 TABLET ORAL
Qty: 2 | Refills: 0 | Status: ACTIVE | COMMUNITY
Start: 2024-01-19 | End: 1900-01-01

## 2024-01-21 ENCOUNTER — RX RENEWAL (OUTPATIENT)
Age: 79
End: 2024-01-21

## 2024-01-21 RX ORDER — LEVETIRACETAM 500 MG/1
500 TABLET, FILM COATED ORAL TWICE DAILY
Qty: 180 | Refills: 3 | Status: ACTIVE | COMMUNITY
Start: 2023-06-26 | End: 1900-01-01

## 2024-02-07 ENCOUNTER — RX RENEWAL (OUTPATIENT)
Age: 79
End: 2024-02-07

## 2024-04-10 RX ORDER — ONDANSETRON 8 MG/1
8 TABLET, ORALLY DISINTEGRATING ORAL
Qty: 30 | Refills: 3 | Status: ACTIVE | COMMUNITY
Start: 2023-06-21 | End: 1900-01-01

## 2024-04-12 ENCOUNTER — APPOINTMENT (OUTPATIENT)
Dept: NEUROLOGY | Facility: CLINIC | Age: 79
End: 2024-04-12
Payer: MEDICARE

## 2024-04-12 VITALS
BODY MASS INDEX: 30.73 KG/M2 | OXYGEN SATURATION: 95 % | HEART RATE: 81 BPM | DIASTOLIC BLOOD PRESSURE: 86 MMHG | HEIGHT: 64 IN | SYSTOLIC BLOOD PRESSURE: 147 MMHG | WEIGHT: 180 LBS

## 2024-04-12 DIAGNOSIS — Z74.09 OTHER REDUCED MOBILITY: ICD-10-CM

## 2024-04-12 DIAGNOSIS — E55.9 VITAMIN D DEFICIENCY, UNSPECIFIED: ICD-10-CM

## 2024-04-12 PROCEDURE — 99215 OFFICE O/P EST HI 40 MIN: CPT

## 2024-04-12 RX ORDER — VITAMIN B COMPLEX
CAPSULE ORAL
Refills: 0 | Status: ACTIVE | COMMUNITY

## 2024-04-12 RX ORDER — CHROMIUM 200 MCG
TABLET ORAL
Refills: 0 | Status: ACTIVE | COMMUNITY

## 2024-04-12 RX ORDER — AMOXICILLIN AND CLAVULANATE POTASSIUM 500; 125 MG/1; MG/1
500-125 TABLET, FILM COATED ORAL
Qty: 14 | Refills: 0 | Status: DISCONTINUED | COMMUNITY
Start: 2024-01-17 | End: 2024-04-12

## 2024-04-12 RX ORDER — METOPROLOL TARTRATE 100 MG/1
100 TABLET, FILM COATED ORAL
Refills: 0 | Status: ACTIVE | COMMUNITY

## 2024-04-12 NOTE — DISCUSSION/SUMMARY
[FreeTextEntry1] : Felicia is a pleasant 78-year-old RH woman with pmh of smoking, COPD, rheumatoid arthritis, hypertension, MTHFR mutation, new onset atrial fibrillation on eliquis, who presented to [facility] 2/2023 with transient aphasia and confusion in the setting of blood pressure in the 200s systolic. MRI brain without acute stroke. Her symptoms resolved when she was given valium for an imaging study. 2 hour EEG with left LRDA? suggestive of cortical hyper-excitability, no seizure.  On  mg bid. Tolerating well. In physical therapy for unsteady gait, no falls. Lives with supportive daughter.  Discussed differential including hypertensive emergency, provoked focal seizure or unprovoked seizure. Given her advanced age, high fall risk and the fact that she is on systemic AC, would continue an AED for now. Most recent EEG with rare bifrontal sharp waves and left anterior temporal LRDA, rare GRDA.  No further seizures. Doing well apart from unsteady gait.

## 2024-04-12 NOTE — HISTORY OF PRESENT ILLNESS
[FreeTextEntry1] : Last seen in clinic on 1/26/24. Doing okay. Presents with daughter who she lives with. States blood pressure is finally under control. No seizures. Tolerating levetiracetam. Wants to know if she can drive. Daughter doesn't want her to drive, does not think it is safe.  Has a very unsteady gait due to left hip and knee surgery (one leg (right) is shorter than the left)). No falls. Ambulates with a walker. Does not want physical therapy.   Daughter upset as mom eats pizza, ice cream and is not active. Does not go outside. Rheumatoid arthritis is acting up.  She knows not to miss does of eliquis.   Daughter does not believe she can drive safely due to poor mobility.    Dr. Torres : heart  Dr. Fernandes: pulmonary No seizures   Medications: levetiracetam 500 mg every 12 hours folic acid 1 mg  zofran  pantoprazole prednisone 5 mg   trazodone 50 mg  amlodipine 5 mg  hydralazine Olmesartan leflunomide 20 mg  vitamin d metoprolol 100 mg  albuterol _______________________________________________ 6/26/23  Last seen in clinic on 2/24/23. Could not come to clinic today because of a COVID exposure. Doing well. Watching blood pressure, around 140s. Lives with daughter. Taking levetiracetam 750 mg bid. No major side effects. No falls. Ambulates with a walker and a cane. No falls. Doing well.  No clinical events concerning for seizure.   Medications levetiracetam 750 mg twice a day  eliquis 5 mg twice a day  ondansetron folic acid  prednisone  trelegy  trazodone 50 mg at night  _______________________ 2/24/23  77-year-old  RH woman with a history of MTHFR gene on aspirin, hypothyroidism, rheumatoid arthritis, SVT, hypertension and new onset atrial fibrillation, back surgery. She presented to ED with an episode of not speaking well. Daughter thought speech may have been slurred and she was not making sense. She knew what she wanted to say but couldn't get the words out. She may have been confused as well and was lethargic.  She didn't remember the passcode on her phone.  Her blood pressure was as high as 222/130. No TNK was given as last known well was out of window.  On exam by neurology she had intermittent expressive aphasia. NIHSS was 2 (1 for orientation and 1 for mild loss of fluency).   She was found to have new onset atrial fibrillation with RVR, given eliquis.  MRI brain was suboptimal with no acute intracranial hemorrhage or acute infarct.  She was given diazepam for imaging ? and she was noted to be back to baseline.  Given concern for seizure she was started on  mg bid.  She had a 2 hour EEG which showed left rhythmic delta activity suggestive of possible cortical irritability.   Prior to her admission she went to [facility].  Her sodium was low, thought related to a diuretic and her antihypertensive medications were readjusted. She may have been taking an incorrect dose and this is why her BP was so high. No further episodes of word-finding difficulty. Tolerating levetiracetam. No further episodes.   Seizure risk factors No further episodes of word-finding difficulty No history of episodes of loss of consciousness No history of convulsion No family history of epilepsy  No history of meningitis or encephalitis No history of major head injury  No difficulty with birth Met developmental milestones Did well in school   Medications: amlodipine 5 mg qd  metoprolol 50 mg bid  levetiracetam 750 mg bid eliquis 5 mg bid  lipitor 40 mg qhs lasix 20 mg qd leflunomide 20 mg qd  trazodone 50 mg qhs  pantoprazole  folic acid  vitamin d albuterol fluticasone  trelegy  ondansetron  [facility] COPD, prior smoker atrial fibrillation focal seizures  hypertension   Social History Lives with daughter  Not driving  ambulates with a walker  Smoker 2003, 1.5 ppd  no alcohol, no drugs  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 Shyanne Cuevas PMD

## 2024-04-12 NOTE — PHYSICAL EXAM
[FreeTextEntry1] : Mental Status: knows month, day and year. Knows name of hospital. $2.25 = 9. Able to do serial sevens.  Language/Speech : speech fluent Cranial Nerves  II: Pupils equal and reactive,  VFF full III, IV, VI: EOMI V : normal sensation in V1-V3 b/l VII : no asymmetry, no nasolabial fold flattening VIII: normal hearing to voice  IX, X: normal palatal elevation, uvula midline XII : midline tongue protrusion Motor: no drift in limbs apart from weakness in left shoulder, cannot fully elevate arm  Sensory: normal to light touch  Coordination: Normal finger to nose Gait: unsteady gait, in part due to one leg being shorter than the other , appears to slightly waddle when walking, feet everted, needs assistance getting onto the examination bench

## 2024-04-12 NOTE — DATA REVIEWED
[de-identified] : 2/5/23  FINDINGS: There is mild diffuse parenchymal volume loss. There are T2 prolongation signal abnormalities in the periventricular subcortical white matter likely related to mild chronic microvascular ischemic changes.  There is no acute parenchymal hemorrhage, parenchymal mass, mass effect or midline shift. There is no extra-axial fluid collection.  There is no hydrocephalus.  There is no acute infarct.  The visualized paranasal sinuses are well aerated. Partial opacification mastoid air cells.  IMPRESSION: Limited exam due to motion   2/4/23  CT head without contrast  INDICATION: Possible stroke  TECHNIQUE: CT examination of the head performed without contrast. Multiple axial images obtained from skull base to vertex. Sagittal/coronal reformatted images obtained from axial data set. Images reviewed in soft tissue and bone windows.   COMPARISON: None available.   FINDINGS:  Ventricles and sulci are mildly proportionately prominent likely related to mild parenchymal volume loss. No hydrocephalus. No extra-axial fluid collection identified.  No acute intracranial hemorrhage identified. There is mild subcortical and periventricular white matter attenuation nonspecific but favored to reflect sequela of mild chronic microvascular ischemia. Gray-white differentiation is preserved without CT evidence for acute transcortical infarction. There is no significant midline shift, mass effect or herniation.  Sellar and suprasellar region are unremarkable in appearance.  Visualized paranasal sinuses and mastoid air cells are well aerated. No significant cerebellar tonsillar herniation.   No displaced calvarial fractures are identified. No suspicious expansile or destructive calvarial lesion identified. No significant scalp soft tissue swelling identified.      IMPRESSION:  No acute intracranial hemorrhage, hydrocephalus or extra-axial fluid collection. Mild parenchymal volume loss and mild chronic microvascular ischemic changes. No CT evidence for acute transcortical infarction. Please note that MR imaging is a more sensitive imaging modality for detection of acute infarction and may be obtained as clinically warranted.  If clinicians have any questions/need for clarification regarding this report, Dr. Kingsley Rincon can be best reached via the patient secure hospital email system  CTA (CT angiogram) of the HEAD and NECK dated 2/4/2023 6:07 PM   CLINICAL STATEMENT: Neuro deficit, acute, stroke suspected.  TECHNIQUE: Following the administration of 60 cc Omnipaque 350 intravenous contrast, CT angiograms of the head and neck were obtained. 3D image postprocessing was performed with vascular and multi-planar reformats for the evaluation of the arteries. All qualitative and quantitative assessments of carotid bifurcation and proximal internal carotid artery stenoses are made referencing the distal internal carotid artery.   COMPARISON: None.  FINDINGS:  CTA of the head:  The proximal aspects of anterior, middle, and posterior cerebral arteries are patent. Fetal origin of the left posterior cerebral artery is incidentally noted. The intracranial vertebral and basilar arteries are patent. No hemodynamically significant stenosis or aneurysm is identified. Mild calcification of the bilateral distal internal carotid arteries is seen.  CTA of the neck:  The vertebral arteries, common carotid arteries, and internal carotid arteries are normal in course and caliber. There is no hemodynamically significant stenosis, arterial dissection, or aneurysm. Mild calcification of the bilateral carotid bifurcations is noted.  Other:  The visualized upper lungs demonstrate centrilobular emphysema. Degenerative changes of the spine and bilateral temporomandibular joints are present. Posterior fusion hardware within thoracic spine is partially imaged.   IMPRESSION:  No hemodynamically significant large vessel stenosis or occlusion in the head and neck.   CTP: Patient motion is present which limits evaluation. There is an area of apparent increased Tmax greater than 6 seconds within the brainstem/right cerebellar peduncle region without associated CBF abnormality.  CBF less than 30% volume: 0 mL Tmax greater than 6 seconds volume: 10 mL Mismatch volume: 10 mL Mismatch ratio: Infinite  [de-identified] : 3/2/2023 - Ambulatory EEG: Rare bifrontal sharp waves suggestive of generalized cortical hyper-excitability, rare left anterior temporal LRDA, Rare GRDA cEEG_[facility]: 2/2023 Report pending  -2 hour: left rhythmic delta activity - suggestive of cortical hyperexcitability

## 2024-04-17 LAB
25(OH)D3 SERPL-MCNC: 35.2 NG/ML
ALBUMIN SERPL ELPH-MCNC: 3.8 G/DL
ALP BLD-CCNC: 125 U/L
ALT SERPL-CCNC: 16 U/L
ANION GAP SERPL CALC-SCNC: 15 MMOL/L
AST SERPL-CCNC: 23 U/L
BILIRUB SERPL-MCNC: 0.4 MG/DL
BUN SERPL-MCNC: 9 MG/DL
CALCIUM SERPL-MCNC: 8.8 MG/DL
CHLORIDE SERPL-SCNC: 100 MMOL/L
CO2 SERPL-SCNC: 25 MMOL/L
CREAT SERPL-MCNC: 0.89 MG/DL
EGFR: 66 ML/MIN/1.73M2
GLUCOSE SERPL-MCNC: 91 MG/DL
LEVETIRACETAM SERPL-MCNC: 22.1 UG/ML
POTASSIUM SERPL-SCNC: 4.1 MMOL/L
PROT SERPL-MCNC: 6.6 G/DL
SODIUM SERPL-SCNC: 140 MMOL/L
VIT B12 SERPL-MCNC: 686 PG/ML

## 2024-05-07 ENCOUNTER — APPOINTMENT (OUTPATIENT)
Dept: NEUROLOGY | Facility: CLINIC | Age: 79
End: 2024-05-07
Payer: MEDICARE

## 2024-05-07 PROCEDURE — 95816 EEG AWAKE AND DROWSY: CPT

## 2024-05-08 ENCOUNTER — APPOINTMENT (OUTPATIENT)
Dept: NEUROLOGY | Facility: CLINIC | Age: 79
End: 2024-05-08

## 2024-05-08 PROCEDURE — 95700 EEG CONT REC W/VID EEG TECH: CPT

## 2024-05-08 PROCEDURE — 95708 EEG WO VID EA 12-26HR UNMNTR: CPT

## 2024-05-08 PROCEDURE — 95719 EEG PHYS/QHP EA INCR W/O VID: CPT

## 2024-05-15 ENCOUNTER — APPOINTMENT (OUTPATIENT)
Dept: PULMONOLOGY | Facility: CLINIC | Age: 79
End: 2024-05-15
Payer: MEDICARE

## 2024-05-15 VITALS
HEART RATE: 86 BPM | OXYGEN SATURATION: 91 % | TEMPERATURE: 97 F | WEIGHT: 202 LBS | HEIGHT: 64 IN | SYSTOLIC BLOOD PRESSURE: 126 MMHG | BODY MASS INDEX: 34.49 KG/M2 | DIASTOLIC BLOOD PRESSURE: 80 MMHG

## 2024-05-15 DIAGNOSIS — J44.9 CHRONIC OBSTRUCTIVE PULMONARY DISEASE, UNSPECIFIED: ICD-10-CM

## 2024-05-15 PROCEDURE — G2211 COMPLEX E/M VISIT ADD ON: CPT

## 2024-05-15 PROCEDURE — 99212 OFFICE O/P EST SF 10 MIN: CPT

## 2024-05-15 NOTE — HISTORY OF PRESENT ILLNESS
[TextBox_4] : Ms. Pozo is a 77F here for follow-up of COPD. She was previously following with Dr. March (last visit 1/2023).  Current regimen: Trelegy Exacerbations: rare  PFTs 5/2021 Moderate obstruction, no BD response. No hyperinflation/airtrapping, DLCO 48%  Pt feels well today and without significant respiratory complaints. She has not had any exacerbations but knows her seasonal allergies will start soon so will resume flonase. She has been compliant with Trelegy but only taking samples. She has not tried other inhalers before so unsure what else has worked for her. Denies cough, shortness of breath, wheezing, chest pressure. Currently she is dealing with more L shoulder pain and as a result her BP is running higher. She has follow-up with her Ortho to address non-invasive treatments for her shoulder and pain control.  6/2023 Ms. Pozo returns today for follow-up. She is again accompanied by her friend. Still dealing with L shoulder pain. She has yet to follow-up with Ortho re: treatment options. Her breathing is improving now that she was able to get the Trelegy through her insurance. She needs Rx to fill one month at a time instead of 3 months. No recent AECOPDs.    9/2023 Ms. Pozo returns today for follow-up. Doing well since last visit. No recent exacerbations. Denies cough, sputum production, wheezing. She remains compliant with Trelegy. She is requesting refill of zofran for occasional nausea that is food-related.  1/2024 Ms. POZO returns today for follow-up. She is again accompanied by her family member. She notes that about a week ago she has had sinus fullness with subsequent HA and new cough. She's had episodes of dizziness due to head fullness. She also notes low grade fever last week. She now has a more productive cough and runny nose. No ear pain, eye pain, shortness of breath, wheezing, hemoptysis.  5/2024 Ms. POZO returns today for follow-up. Doing well since last visit. No recent COPD Exacerbations or hospitalizations. She had bloody drainage from R ear recently and has been following with ENT for that.

## 2024-05-15 NOTE — DISCUSSION/SUMMARY
[FreeTextEntry1] : 78F with COPD here for routine follow-up  #COPD  - Doing well on current regimen of Trelegy with rare rescue inhaler use - Lung exam benign today  RTC in 6 months

## 2024-05-15 NOTE — PHYSICAL EXAM
[No Acute Distress] : no acute distress [Normal Rate/Rhythm] : normal rate/rhythm [Normal S1, S2] : normal s1, s2 [No Murmurs] : no murmurs [No Resp Distress] : no resp distress [No Acc Muscle Use] : no acc muscle use [Clear to Auscultation Bilaterally] : clear to auscultation bilaterally [No Clubbing] : no clubbing [No Edema] : no edema [Oriented x3] : oriented x3 [Normal Affect] : normal affect [TextBox_11] : Could not visualize R TM, L TM without fullness or exudate [TextBox_99] : Joint deformities in hands c/w RA; kyphosis of chest

## 2024-05-15 NOTE — REVIEW OF SYSTEMS
[Fever] : no fever [Sore Throat] : sore throat [Nasal Congestion] : nasal congestion [Postnasal Drip] : postnasal drip [Sinus Problems] : sinus problems [Cough] : cough [Hemoptysis] : no hemoptysis [Chest Tightness] : no chest tightness [Sputum] : sputum [Dyspnea] : no dyspnea [Pleuritic Pain] : no pleuritic pain [Wheezing] : no wheezing [SOB on Exertion] : sob on exertion [Arthralgias] : arthralgias [Chronic Pain] : chronic pain [Headache] : headache [Dizziness] : dizziness [Negative] : Endocrine

## 2024-07-01 ENCOUNTER — TRANSCRIPTION ENCOUNTER (OUTPATIENT)
Age: 79
End: 2024-07-01

## 2024-07-29 ENCOUNTER — RX RENEWAL (OUTPATIENT)
Age: 79
End: 2024-07-29

## 2024-08-26 NOTE — DISCUSSION/SUMMARY
[FreeTextEntry1] : Felicia is a pleasant 77-year-old RH woman with pmh of smoking, COPD, rheumatoid arthritis, hypertension, MTHFR mutation, new onset atrial fibrillation on eliquis, who presented to [facility] with transient aphasia and confusion in the setting of blood pressure in the 200s systolic. MRI brain without acute stroke. Her symptoms resolved when she was given valium for an imaging study. 2 hour EEG with left LRDA? suggestive of cortical hyper-excitability, no seizure. \par \par On  mg bid. Tolerating well. In physical therapy for unsteady gait, no falls. Lives with supportive daughter. \par \par Discussed differential including hypertensive emergency, provoked focal seizure or unprovoked seizure. Given her advanced age, high fall risk and the fact that she is on systemic AC, would continue an AED for now. Most recent EEG with rare bifrontal sharp waves and left anterior temporal LRDA, rare GRDA. \par Will decrease levetiracetam slightly to 500 mg bid but should not decrease more than that given continued risk of seizure. \par  \par \par 
Parents

## 2024-09-11 ENCOUNTER — NON-APPOINTMENT (OUTPATIENT)
Age: 79
End: 2024-09-11

## 2024-09-17 ENCOUNTER — APPOINTMENT (OUTPATIENT)
Dept: PAIN MANAGEMENT | Facility: CLINIC | Age: 79
End: 2024-09-17
Payer: MEDICARE

## 2024-09-17 VITALS
DIASTOLIC BLOOD PRESSURE: 84 MMHG | SYSTOLIC BLOOD PRESSURE: 146 MMHG | BODY MASS INDEX: 33.29 KG/M2 | WEIGHT: 195 LBS | HEIGHT: 64 IN | HEART RATE: 74 BPM

## 2024-09-17 DIAGNOSIS — G89.4 CHRONIC PAIN SYNDROME: ICD-10-CM

## 2024-09-17 DIAGNOSIS — M79.18 MYALGIA, OTHER SITE: ICD-10-CM

## 2024-09-17 DIAGNOSIS — M96.1 POSTLAMINECTOMY SYNDROME, NOT ELSEWHERE CLASSIFIED: ICD-10-CM

## 2024-09-17 DIAGNOSIS — B02.29 OTHER POSTHERPETIC NERVOUS SYSTEM INVOLVEMENT: ICD-10-CM

## 2024-09-17 PROCEDURE — G2211 COMPLEX E/M VISIT ADD ON: CPT

## 2024-09-17 PROCEDURE — 99204 OFFICE O/P NEW MOD 45 MIN: CPT

## 2024-09-17 RX ORDER — LIDOCAINE AND PRILOCAINE 25; 25 MG/G; MG/G
2.5-2.5 CREAM TOPICAL
Qty: 1 | Refills: 2 | Status: ACTIVE | COMMUNITY
Start: 2024-09-17 | End: 1900-01-01

## 2024-09-17 NOTE — HISTORY OF PRESENT ILLNESS
[Back Pain] : back pain [___ mths] : [unfilled] month(s) ago [Constant] : constant [8] : a minimum pain level of 8/10 [10] : a maximum pain level of 10/10 [Burning] : burning [Medications] : medications [FreeTextEntry1] : HPI     Ms. 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 Right lower back shingles in July. One dose of acyclovir but discontinued due to BP elevation. Continues to have severe pain. No relief with Gabapentin 3600mg. Muscle relaxers with adverse reaction. Topicals with no relief.  Pain so severe it is impacting her quality of life. Denies any additional weakness, numbness, bowel/bladder dysfunction, history of bleeding disorders.  >> Imaging and Other Studies   na  >> Therapy and Other Modalities  na   >> Medications   >> Interventions   >> Consults   >> Discussion of Risks/Benefits/Alternatives    >Regarding any scheduled procedures:   In the event the patient is scheduled for a procedure,   I have discussed in detail with the patient that any interventional pain procedure is associated with potential risks.  The procedure may include an injection of steroids and potentially other medications (local anesthetic and normal saline) into the epidural space or surrounding tissue of the spine.  There are significant risks of this procedure which include and are not limited to infection, bleeding, worsening pain, dural puncture leading to postdural puncture headache, nerve damage, spinal cord injury, paralysis, stroke, and death.   There is a chance that the procedure does not improve their pain.   There are risks associated with the steroid being absorbed into the body systemically.  These include dysphoria, difficulty sleeping, mood swings and personality changes.  Premenopausal women may notice an irregularity in her menstrual cycle for 2-3 months following the injection.  Steroids can specifically affect patients with hypertension, diabetes, and peptic ulcers.  The procedure may cause a temporary increase in blood pressure and blood pressure, and may adversely affect a peptic ulcer.  Other, more rare complications, include avascular necrosis of joints, glaucoma and worsening of osteoporosis.   I have discussed the risks of the procedure at length with the patient, and the potential benefits of pain relief.  I have offered alternatives to the procedure.  All questions were answered.   The patient expressed understanding and wishes to proceed with the procedure.   > Longitudinal management of Complex Painful condition   The patient is being managed for a complex condition that requires ongoing management.  The nature of this condition demands nuanced approach to treatment.  The seriousness of the condition necessitates an in-depth and focused approach to management and coordination with other healthcare professionals.    This visit involves intricate evaluation and management of the patient's condition.  The complexity of the visit was due to the need for detailed assessment of the current state, consideration of potential complications and a careful balancing of treatment options to management the chronic condition effectively.   As detailed above, the patient has a chronic significant painful condition that requires regular and detailed management.  The condition's impact on the patient's quality of life and health is substantial and necessitates a comprehensive and tailored approach   >> Conclusion   There were no barriers to communication. Informed patient that I would be available for any additional questions. Patient was instructed to call with any worsening symptoms including severe pain, new numbness/weakness, or changes in the bowel/bladder function. Discussed role of nsaids in pain management and all relevant risks, if patient is continuing to require after 4 weeks the patient should f/u for alternative treatment. Instructed patient to maintain pain diary to monitor pain level, mobility, and function.   The referring provider was informed of the above diagnosis and treatment plan. [FreeTextEntry7] : right lower back pain

## 2024-09-17 NOTE — ASSESSMENT
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  Encouraged to call with any worsening mood or depression/suicidal ideations    >> Interventions   may consider miya  >> Consults  na   >> Discussion of Risks/Benefits/Alternatives    >Regarding any scheduled procedures:   In the event the patient is scheduled for a procedure,   I have discussed in detail with the patient that any interventional pain procedure is associated with potential risks.  The procedure may include an injection of steroids and potentially other medications (local anesthetic and normal saline) into the epidural space or surrounding tissue of the spine.  There are significant risks of this procedure which include and are not limited to infection, bleeding, worsening pain, dural puncture leading to postdural puncture headache, nerve damage, spinal cord injury, paralysis, stroke, and death.   There is a chance that the procedure does not improve their pain.   There are risks associated with the steroid being absorbed into the body systemically.  These include dysphoria, difficulty sleeping, mood swings and personality changes.  Premenopausal women may notice an irregularity in her menstrual cycle for 2-3 months following the injection.  Steroids can specifically affect patients with hypertension, diabetes, and peptic ulcers.  The procedure may cause a temporary increase in blood pressure and blood pressure, and may adversely affect a peptic ulcer.  Other, more rare complications, include avascular necrosis of joints, glaucoma and worsening of osteoporosis.   I have discussed the risks of the procedure at length with the patient, and the potential benefits of pain relief.  I have offered alternatives to the procedure.  All questions were answered.   The patient expressed understanding and wishes to proceed with the procedure.   > Longitudinal management of Complex Painful condition   The patient is being managed for a complex condition that requires ongoing management.  The nature of this condition demands nuanced approach to treatment.  The seriousness of the condition necessitates an in-depth and focused approach to management and coordination with other healthcare professionals.    This visit involves intricate evaluation and management of the patient's condition.  The complexity of the visit was due to the need for detailed assessment of the current state, consideration of potential complications and a careful balancing of treatment options to management the chronic condition effectively.   As detailed above, the patient has a chronic significant painful condition that requires regular and detailed management.  The condition's impact on the patient's quality of life and health is substantial and necessitates a comprehensive and tailored approach   >> Conclusion   There were no barriers to communication. Informed patient that I would be available for any additional questions. Patient was instructed to call with any worsening symptoms including severe pain, new numbness/weakness, or changes in the bowel/bladder function. Discussed role of nsaids in pain management and all relevant risks, if patient is continuing to require after 4 weeks the patient should f/u for alternative treatment. Instructed patient to maintain pain diary to monitor pain level, mobility, and function.

## 2024-09-24 ENCOUNTER — APPOINTMENT (OUTPATIENT)
Dept: PAIN MANAGEMENT | Facility: CLINIC | Age: 79
End: 2024-09-24
Payer: MEDICARE

## 2024-09-24 VITALS — SYSTOLIC BLOOD PRESSURE: 119 MMHG | HEART RATE: 67 BPM | DIASTOLIC BLOOD PRESSURE: 74 MMHG | OXYGEN SATURATION: 94 %

## 2024-09-24 PROCEDURE — 99213 OFFICE O/P EST LOW 20 MIN: CPT

## 2024-09-24 NOTE — ASSESSMENT
[FreeTextEntry1] : >> pending MRI  >> Patient evaluated for medical cannabis. Reviewed medical history and current medications. Without significant contraindications including severe psychiatric illness or severe cardiovascular illness. Treatment team feels that patient meets criteria for medical cannabis certification including Chronic Pain that degrades functional capabilities. pmhx CVA vs seizure, Advised to discuss with neurologist prior to initiation given ?? seizure hx. Will certify for low THC:High CBD and 1:1. Advised to start low. Start low THC:High CBD. Reviewed potential for adverse events and stressed not to drive, make important life-changing decisions, operate heavy machinery during treatment or mix with sedation medications Explained patient will need to report adverse events to provider. Encouraged patient to ask additional questions. Advised patient can also direct questions to personnel and pharmacist at the dispensary. Discussed certification process. All questions answered.       Certification process performed on [facility]   Patient directed to [facility] Website to find dispensary   [employer]-stop reference number #: [id number]   Agreement for use of Controlled substance medication(s) completed      >> Medications  lidocaine patch  capsaicin  acetaminophen 650mg q8h prn pain (caution <3g daily)  gabapentin TID

## 2024-09-24 NOTE — HISTORY OF PRESENT ILLNESS
[Back Pain] : back pain [___ mths] : [unfilled] month(s) ago [Constant] : constant [8] : a minimum pain level of 8/10 [10] : a maximum pain level of 10/10 [Burning] : burning [Medications] : medications [FreeTextEntry1] :   Interval Note:  Since last visit the pain is not improved. Severe pain to right lower back. Medicines are not helpful. Pending MRI authorization. Patient and her daughter are interested in medical cannabis. Denies and personal or FH SI or schizophrenia. Pain is impacting her quality of life.  [id number]  Practitioner Count: 1 Pharmacy Count: 1 Current Opioid Prescriptions: 0 Current Benzodiazepine Prescriptions: 0 Current Stimulant Prescriptions: 0   Patient Demographic Information (PDI)     PDI	First Name	Last Name	Birth Date	Gender	Street Address	[facility]	Zip Code [place]	1945	Female	[address] [facility]	[address]  Prescription Information    PDI Filter:   PDI	My Rx	Current Rx	Drug Type	Rx Written	Rx Dispensed	Drug	Quantity	Days Supply	Prescriber Name	Prescriber SONIA #	Payment Method	Dispenser A	N	N	O	07/02/2024	07/02/2024	hydromorphone 4 mg tablet	12	4	Shyanne Cuevas MD	[id number]	Medicare	[id number]   HPI     Ms. 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 Right lower back shingles in July. One dose of acyclovir but discontinued due to BP elevation. Continues to have severe pain. No relief with Gabapentin 3600mg. Muscle relaxers with adverse reaction. Topicals with no relief.  Pain so severe it is impacting her quality of life. Denies any additional weakness, numbness, bowel/bladder dysfunction, history of bleeding disorders.   Previous and current pain medications/doses/effects: Gabapentin 3600mg, Lidocaine patch, capsaicin, acetaminophen     Previous Pain Treatments:     Previous Pain Injections:     Previous Diagnostic Studies/Images:       [FreeTextEntry7] : right lower back pain

## 2024-10-08 ENCOUNTER — APPOINTMENT (OUTPATIENT)
Dept: PAIN MANAGEMENT | Facility: CLINIC | Age: 79
End: 2024-10-08
Payer: MEDICARE

## 2024-10-08 VITALS
HEIGHT: 64 IN | HEART RATE: 83 BPM | WEIGHT: 195 LBS | DIASTOLIC BLOOD PRESSURE: 78 MMHG | SYSTOLIC BLOOD PRESSURE: 132 MMHG | BODY MASS INDEX: 33.29 KG/M2

## 2024-10-08 DIAGNOSIS — B02.29 OTHER POSTHERPETIC NERVOUS SYSTEM INVOLVEMENT: ICD-10-CM

## 2024-10-08 DIAGNOSIS — M79.18 MYALGIA, OTHER SITE: ICD-10-CM

## 2024-10-08 DIAGNOSIS — M96.1 POSTLAMINECTOMY SYNDROME, NOT ELSEWHERE CLASSIFIED: ICD-10-CM

## 2024-10-08 DIAGNOSIS — G89.4 CHRONIC PAIN SYNDROME: ICD-10-CM

## 2024-10-08 PROCEDURE — G2211 COMPLEX E/M VISIT ADD ON: CPT

## 2024-10-08 PROCEDURE — 99214 OFFICE O/P EST MOD 30 MIN: CPT

## 2024-10-22 ENCOUNTER — APPOINTMENT (OUTPATIENT)
Dept: PAIN MANAGEMENT | Facility: CLINIC | Age: 79
End: 2024-10-22

## 2024-10-25 ENCOUNTER — APPOINTMENT (OUTPATIENT)
Dept: PAIN MANAGEMENT | Facility: CLINIC | Age: 79
End: 2024-10-25
Payer: MEDICARE

## 2024-10-25 VITALS
RESPIRATION RATE: 14 BRPM | HEART RATE: 68 BPM | OXYGEN SATURATION: 94 % | DIASTOLIC BLOOD PRESSURE: 81 MMHG | SYSTOLIC BLOOD PRESSURE: 148 MMHG

## 2024-10-25 DIAGNOSIS — M96.1 POSTLAMINECTOMY SYNDROME, NOT ELSEWHERE CLASSIFIED: ICD-10-CM

## 2024-10-25 PROCEDURE — 62323 NJX INTERLAMINAR LMBR/SAC: CPT

## 2024-10-25 RX ADMIN — TRIAMCINOLONE ACETONIDE 0 MG/ML: 80 INJECTION, SUSPENSION INTRA-ARTICULAR; INTRAMUSCULAR at 00:00

## 2024-10-25 RX ADMIN — LIDOCAINE HYDROCHLORIDE %: 10 INJECTION, SOLUTION INFILTRATION; PERINEURAL at 00:00

## 2024-10-25 RX ADMIN — IOHEXOL 0 MG/ML: 180 INJECTION INTRAVENOUS at 00:00

## 2024-11-07 ENCOUNTER — APPOINTMENT (OUTPATIENT)
Dept: PULMONOLOGY | Facility: CLINIC | Age: 79
End: 2024-11-07

## 2024-11-12 ENCOUNTER — APPOINTMENT (OUTPATIENT)
Dept: PAIN MANAGEMENT | Facility: CLINIC | Age: 79
End: 2024-11-12
Payer: MEDICARE

## 2024-11-12 VITALS
WEIGHT: 195 LBS | HEART RATE: 79 BPM | SYSTOLIC BLOOD PRESSURE: 146 MMHG | HEIGHT: 64 IN | DIASTOLIC BLOOD PRESSURE: 76 MMHG | BODY MASS INDEX: 33.29 KG/M2

## 2024-11-12 DIAGNOSIS — M79.18 MYALGIA, OTHER SITE: ICD-10-CM

## 2024-11-12 DIAGNOSIS — G89.4 CHRONIC PAIN SYNDROME: ICD-10-CM

## 2024-11-12 DIAGNOSIS — M79.2 NEURALGIA AND NEURITIS, UNSPECIFIED: ICD-10-CM

## 2024-11-12 DIAGNOSIS — B02.29 OTHER POSTHERPETIC NERVOUS SYSTEM INVOLVEMENT: ICD-10-CM

## 2024-11-12 DIAGNOSIS — M96.1 POSTLAMINECTOMY SYNDROME, NOT ELSEWHERE CLASSIFIED: ICD-10-CM

## 2024-11-12 PROCEDURE — G2211 COMPLEX E/M VISIT ADD ON: CPT

## 2024-11-12 PROCEDURE — 99214 OFFICE O/P EST MOD 30 MIN: CPT

## 2024-11-12 RX ORDER — LIDOCAINE 40 MG/G
4 PATCH TOPICAL
Qty: 30 | Refills: 1 | Status: ACTIVE | COMMUNITY
Start: 2024-11-12 | End: 1900-01-01

## 2024-12-04 ENCOUNTER — APPOINTMENT (OUTPATIENT)
Dept: PAIN MANAGEMENT | Facility: CLINIC | Age: 79
End: 2024-12-04

## 2025-01-29 ENCOUNTER — RX RENEWAL (OUTPATIENT)
Age: 80
End: 2025-01-29

## 2025-02-12 ENCOUNTER — APPOINTMENT (OUTPATIENT)
Dept: ORTHOPEDIC SURGERY | Facility: CLINIC | Age: 80
End: 2025-02-12
Payer: MEDICARE

## 2025-02-12 VITALS
HEART RATE: 75 BPM | DIASTOLIC BLOOD PRESSURE: 80 MMHG | SYSTOLIC BLOOD PRESSURE: 152 MMHG | HEIGHT: 63 IN | OXYGEN SATURATION: 96 %

## 2025-02-12 DIAGNOSIS — M54.2 CERVICALGIA: ICD-10-CM

## 2025-02-12 DIAGNOSIS — M54.12 RADICULOPATHY, CERVICAL REGION: ICD-10-CM

## 2025-02-12 PROCEDURE — 99204 OFFICE O/P NEW MOD 45 MIN: CPT

## 2025-02-24 ENCOUNTER — RESULT REVIEW (OUTPATIENT)
Age: 80
End: 2025-02-24

## 2025-03-07 ENCOUNTER — APPOINTMENT (OUTPATIENT)
Dept: ORTHOPEDIC SURGERY | Facility: CLINIC | Age: 80
End: 2025-03-07
Payer: MEDICARE

## 2025-03-07 VITALS
OXYGEN SATURATION: 95 % | SYSTOLIC BLOOD PRESSURE: 169 MMHG | HEIGHT: 63 IN | WEIGHT: 193 LBS | DIASTOLIC BLOOD PRESSURE: 69 MMHG | BODY MASS INDEX: 34.2 KG/M2 | HEART RATE: 65 BPM

## 2025-03-07 DIAGNOSIS — M54.12 RADICULOPATHY, CERVICAL REGION: ICD-10-CM

## 2025-03-07 PROCEDURE — 99214 OFFICE O/P EST MOD 30 MIN: CPT

## 2025-03-07 PROCEDURE — G2211 COMPLEX E/M VISIT ADD ON: CPT

## 2025-03-18 ENCOUNTER — APPOINTMENT (OUTPATIENT)
Dept: PAIN MANAGEMENT | Facility: CLINIC | Age: 80
End: 2025-03-18
Payer: MEDICARE

## 2025-03-18 VITALS
OXYGEN SATURATION: 94 % | WEIGHT: 195 LBS | BODY MASS INDEX: 34.55 KG/M2 | HEART RATE: 84 BPM | DIASTOLIC BLOOD PRESSURE: 70 MMHG | SYSTOLIC BLOOD PRESSURE: 129 MMHG | HEIGHT: 63 IN

## 2025-03-18 DIAGNOSIS — M54.12 RADICULOPATHY, CERVICAL REGION: ICD-10-CM

## 2025-03-18 PROCEDURE — 99214 OFFICE O/P EST MOD 30 MIN: CPT

## 2025-04-15 ENCOUNTER — APPOINTMENT (OUTPATIENT)
Dept: ORTHOPEDIC SURGERY | Facility: CLINIC | Age: 80
End: 2025-04-15
Payer: MEDICARE

## 2025-04-15 ENCOUNTER — APPOINTMENT (OUTPATIENT)
Dept: ORTHOPEDIC SURGERY | Facility: CLINIC | Age: 80
End: 2025-04-15

## 2025-04-15 VITALS
BODY MASS INDEX: 34.55 KG/M2 | SYSTOLIC BLOOD PRESSURE: 157 MMHG | DIASTOLIC BLOOD PRESSURE: 73 MMHG | WEIGHT: 195 LBS | OXYGEN SATURATION: 94 % | HEART RATE: 90 BPM | HEIGHT: 63 IN

## 2025-04-15 DIAGNOSIS — M25.551 PAIN IN RIGHT HIP: ICD-10-CM

## 2025-04-15 DIAGNOSIS — M25.552 PAIN IN RIGHT HIP: ICD-10-CM

## 2025-04-15 PROCEDURE — 73523 X-RAY EXAM HIPS BI 5/> VIEWS: CPT

## 2025-04-15 PROCEDURE — 73564 X-RAY EXAM KNEE 4 OR MORE: CPT | Mod: RT

## 2025-04-15 PROCEDURE — 99215 OFFICE O/P EST HI 40 MIN: CPT

## 2025-04-24 ENCOUNTER — TRANSCRIPTION ENCOUNTER (OUTPATIENT)
Age: 80
End: 2025-04-24

## 2025-04-24 ENCOUNTER — APPOINTMENT (OUTPATIENT)
Dept: PAIN MANAGEMENT | Facility: HOSPITAL | Age: 80
End: 2025-04-24

## 2025-04-29 ENCOUNTER — APPOINTMENT (OUTPATIENT)
Dept: PAIN MANAGEMENT | Facility: CLINIC | Age: 80
End: 2025-04-29
Payer: MEDICARE

## 2025-04-29 VITALS
WEIGHT: 195 LBS | BODY MASS INDEX: 34.55 KG/M2 | SYSTOLIC BLOOD PRESSURE: 153 MMHG | HEART RATE: 82 BPM | DIASTOLIC BLOOD PRESSURE: 77 MMHG | OXYGEN SATURATION: 95 % | HEIGHT: 63 IN

## 2025-04-29 DIAGNOSIS — M54.16 RADICULOPATHY, LUMBAR REGION: ICD-10-CM

## 2025-04-29 PROCEDURE — 99213 OFFICE O/P EST LOW 20 MIN: CPT

## 2025-05-28 ENCOUNTER — APPOINTMENT (OUTPATIENT)
Dept: PULMONOLOGY | Facility: CLINIC | Age: 80
End: 2025-05-28
Payer: MEDICARE

## 2025-05-28 VITALS
TEMPERATURE: 97.2 F | BODY MASS INDEX: 34.55 KG/M2 | HEIGHT: 63 IN | WEIGHT: 195 LBS | DIASTOLIC BLOOD PRESSURE: 72 MMHG | OXYGEN SATURATION: 95 % | SYSTOLIC BLOOD PRESSURE: 136 MMHG | HEART RATE: 81 BPM

## 2025-05-28 DIAGNOSIS — J44.9 CHRONIC OBSTRUCTIVE PULMONARY DISEASE, UNSPECIFIED: ICD-10-CM

## 2025-05-28 PROCEDURE — G2211 COMPLEX E/M VISIT ADD ON: CPT

## 2025-05-28 PROCEDURE — 99213 OFFICE O/P EST LOW 20 MIN: CPT

## 2025-05-28 RX ORDER — GABAPENTIN 300 MG
300 TABLET ORAL
Refills: 0 | Status: ACTIVE | COMMUNITY

## 2025-06-05 ENCOUNTER — TRANSCRIPTION ENCOUNTER (OUTPATIENT)
Age: 80
End: 2025-06-05

## 2025-06-05 ENCOUNTER — APPOINTMENT (OUTPATIENT)
Dept: PAIN MANAGEMENT | Facility: HOSPITAL | Age: 80
End: 2025-06-05

## 2025-06-09 ENCOUNTER — APPOINTMENT (OUTPATIENT)
Dept: PAIN MANAGEMENT | Facility: CLINIC | Age: 80
End: 2025-06-09
Payer: MEDICARE

## 2025-06-09 PROBLEM — M47.812 CERVICAL SPONDYLOSIS: Status: ACTIVE | Noted: 2025-06-09

## 2025-06-09 PROCEDURE — 99213 OFFICE O/P EST LOW 20 MIN: CPT | Mod: 2W

## 2025-07-24 ENCOUNTER — APPOINTMENT (OUTPATIENT)
Dept: PAIN MANAGEMENT | Facility: HOSPITAL | Age: 80
End: 2025-07-24